# Patient Record
Sex: FEMALE | Race: WHITE | NOT HISPANIC OR LATINO | Employment: UNEMPLOYED | URBAN - METROPOLITAN AREA
[De-identification: names, ages, dates, MRNs, and addresses within clinical notes are randomized per-mention and may not be internally consistent; named-entity substitution may affect disease eponyms.]

---

## 2018-09-04 ENCOUNTER — HOSPITAL ENCOUNTER (OUTPATIENT)
Dept: RADIOLOGY | Facility: HOSPITAL | Age: 28
Discharge: HOME | End: 2018-09-04
Attending: OBSTETRICS & GYNECOLOGY | Admitting: RADIOLOGY
Payer: COMMERCIAL

## 2018-09-04 DIAGNOSIS — Z31.41 FERTILITY TESTING: ICD-10-CM

## 2018-09-04 PROCEDURE — 74740 X-RAY FEMALE GENITAL TRACT: CPT

## 2018-09-04 PROCEDURE — 58340 CATHETER FOR HYSTEROGRAPHY: CPT

## 2018-09-04 RX ADMIN — IOHEXOL 8 ML: 240 INJECTION, SOLUTION INTRATHECAL; INTRAVASCULAR; INTRAVENOUS; ORAL at 17:40

## 2018-10-15 RX ORDER — ESCITALOPRAM OXALATE 20 MG/1
20 TABLET ORAL
COMMUNITY
End: 2022-08-01

## 2018-10-15 NOTE — PRE-PROCEDURE INSTRUCTIONS
Pre-Surgery Instructions:   Medication Instructions    escitalopram (LEXAPRO) 20 mg tablet Instructed patient per Anesthesia Guidelines   Prenatal MV-Min-Fe Fum-FA-DHA (PRENATAL 1 PO) Instructed patient per Anesthesia Guidelines  Spoke to pt  Medication list reviewed & instructed  As of 10 15 18 instructed on tylenol only  Pt takes lexapro at hs  Am DOS no meds  Showering instructions given at time of call  All instructions verbally understood by patient  No further questions  Antidepressant Med Class     Continue to take this medication on your normal schedule  If this is an oral medication and you take it in the morning, then you may take this medicine with a sip of water

## 2018-10-18 ENCOUNTER — ANESTHESIA EVENT (OUTPATIENT)
Dept: PERIOP | Facility: HOSPITAL | Age: 28
End: 2018-10-18
Payer: COMMERCIAL

## 2018-10-19 ENCOUNTER — HOSPITAL ENCOUNTER (OUTPATIENT)
Facility: HOSPITAL | Age: 28
Setting detail: OUTPATIENT SURGERY
Discharge: HOME | End: 2018-10-19
Attending: OBSTETRICS & GYNECOLOGY | Admitting: OBSTETRICS & GYNECOLOGY
Payer: COMMERCIAL

## 2018-10-19 ENCOUNTER — ANESTHESIA (OUTPATIENT)
Dept: PERIOP | Facility: HOSPITAL | Age: 28
End: 2018-10-19
Payer: COMMERCIAL

## 2018-10-19 VITALS
TEMPERATURE: 98.7 F | RESPIRATION RATE: 21 BRPM | BODY MASS INDEX: 20.49 KG/M2 | HEART RATE: 72 BPM | OXYGEN SATURATION: 99 % | HEIGHT: 64 IN | SYSTOLIC BLOOD PRESSURE: 101 MMHG | WEIGHT: 120 LBS | DIASTOLIC BLOOD PRESSURE: 63 MMHG

## 2018-10-19 DIAGNOSIS — N70.11 HYDROSALPINX: ICD-10-CM

## 2018-10-19 DIAGNOSIS — Z90.79 STATUS POST BILATERAL SALPINGECTOMY: Primary | ICD-10-CM

## 2018-10-19 LAB — EXT PREGNANCY TEST URINE: NEGATIVE

## 2018-10-19 PROCEDURE — 88302 TISSUE EXAM BY PATHOLOGIST: CPT | Performed by: PATHOLOGY

## 2018-10-19 RX ORDER — IBUPROFEN 600 MG/1
600 TABLET ORAL EVERY 6 HOURS PRN
Qty: 30 TABLET | Refills: 0
Start: 2018-10-19 | End: 2019-02-15

## 2018-10-19 RX ORDER — ROCURONIUM BROMIDE 10 MG/ML
INJECTION, SOLUTION INTRAVENOUS AS NEEDED
Status: DISCONTINUED | OUTPATIENT
Start: 2018-10-19 | End: 2018-10-19 | Stop reason: SURG

## 2018-10-19 RX ORDER — CEFAZOLIN SODIUM 1 G/3ML
INJECTION, POWDER, FOR SOLUTION INTRAMUSCULAR; INTRAVENOUS AS NEEDED
Status: DISCONTINUED | OUTPATIENT
Start: 2018-10-19 | End: 2018-10-19 | Stop reason: SURG

## 2018-10-19 RX ORDER — BUPIVACAINE HYDROCHLORIDE 2.5 MG/ML
INJECTION, SOLUTION INFILTRATION; PERINEURAL AS NEEDED
Status: DISCONTINUED | OUTPATIENT
Start: 2018-10-19 | End: 2018-10-19 | Stop reason: HOSPADM

## 2018-10-19 RX ORDER — ONDANSETRON 2 MG/ML
4 INJECTION INTRAMUSCULAR; INTRAVENOUS ONCE AS NEEDED
Status: DISCONTINUED | OUTPATIENT
Start: 2018-10-19 | End: 2018-10-19 | Stop reason: HOSPADM

## 2018-10-19 RX ORDER — ONDANSETRON 2 MG/ML
INJECTION INTRAMUSCULAR; INTRAVENOUS AS NEEDED
Status: DISCONTINUED | OUTPATIENT
Start: 2018-10-19 | End: 2018-10-19 | Stop reason: SURG

## 2018-10-19 RX ORDER — LIDOCAINE HYDROCHLORIDE 10 MG/ML
INJECTION, SOLUTION INFILTRATION; PERINEURAL AS NEEDED
Status: DISCONTINUED | OUTPATIENT
Start: 2018-10-19 | End: 2018-10-19 | Stop reason: SURG

## 2018-10-19 RX ORDER — GLYCOPYRROLATE 0.2 MG/ML
INJECTION INTRAMUSCULAR; INTRAVENOUS AS NEEDED
Status: DISCONTINUED | OUTPATIENT
Start: 2018-10-19 | End: 2018-10-19 | Stop reason: SURG

## 2018-10-19 RX ORDER — IBUPROFEN 600 MG/1
600 TABLET ORAL EVERY 6 HOURS PRN
Status: DISCONTINUED | OUTPATIENT
Start: 2018-10-19 | End: 2018-10-19 | Stop reason: HOSPADM

## 2018-10-19 RX ORDER — MIDAZOLAM HYDROCHLORIDE 1 MG/ML
INJECTION INTRAMUSCULAR; INTRAVENOUS AS NEEDED
Status: DISCONTINUED | OUTPATIENT
Start: 2018-10-19 | End: 2018-10-19 | Stop reason: SURG

## 2018-10-19 RX ORDER — ACETAMINOPHEN 325 MG/1
975 TABLET ORAL EVERY 6 HOURS PRN
Status: DISCONTINUED | OUTPATIENT
Start: 2018-10-19 | End: 2018-10-19 | Stop reason: HOSPADM

## 2018-10-19 RX ORDER — KETAMINE HYDROCHLORIDE 50 MG/ML
INJECTION, SOLUTION, CONCENTRATE INTRAMUSCULAR; INTRAVENOUS AS NEEDED
Status: DISCONTINUED | OUTPATIENT
Start: 2018-10-19 | End: 2018-10-19 | Stop reason: SURG

## 2018-10-19 RX ORDER — SODIUM CHLORIDE, SODIUM LACTATE, POTASSIUM CHLORIDE, CALCIUM CHLORIDE 600; 310; 30; 20 MG/100ML; MG/100ML; MG/100ML; MG/100ML
125 INJECTION, SOLUTION INTRAVENOUS CONTINUOUS
Status: DISCONTINUED | OUTPATIENT
Start: 2018-10-19 | End: 2018-10-19 | Stop reason: HOSPADM

## 2018-10-19 RX ORDER — KETOROLAC TROMETHAMINE 30 MG/ML
30 INJECTION, SOLUTION INTRAMUSCULAR; INTRAVENOUS ONCE
Status: COMPLETED | OUTPATIENT
Start: 2018-10-19 | End: 2018-10-19

## 2018-10-19 RX ORDER — MEPERIDINE HYDROCHLORIDE 25 MG/ML
12.5 INJECTION INTRAMUSCULAR; INTRAVENOUS; SUBCUTANEOUS
Status: DISCONTINUED | OUTPATIENT
Start: 2018-10-19 | End: 2018-10-19 | Stop reason: HOSPADM

## 2018-10-19 RX ORDER — MAGNESIUM HYDROXIDE 1200 MG/15ML
LIQUID ORAL AS NEEDED
Status: DISCONTINUED | OUTPATIENT
Start: 2018-10-19 | End: 2018-10-19 | Stop reason: HOSPADM

## 2018-10-19 RX ORDER — PROPOFOL 10 MG/ML
INJECTION, EMULSION INTRAVENOUS AS NEEDED
Status: DISCONTINUED | OUTPATIENT
Start: 2018-10-19 | End: 2018-10-19 | Stop reason: SURG

## 2018-10-19 RX ADMIN — LIDOCAINE HYDROCHLORIDE 40 MG: 10 INJECTION, SOLUTION INFILTRATION; PERINEURAL at 07:44

## 2018-10-19 RX ADMIN — PROPOFOL 150 MG: 10 INJECTION, EMULSION INTRAVENOUS at 07:44

## 2018-10-19 RX ADMIN — CEFAZOLIN 2000 MG: 1 INJECTION, POWDER, FOR SOLUTION INTRAVENOUS at 07:38

## 2018-10-19 RX ADMIN — KETAMINE HYDROCHLORIDE 10 MG: 50 INJECTION, SOLUTION INTRAMUSCULAR; INTRAVENOUS at 08:27

## 2018-10-19 RX ADMIN — ONDANSETRON 4 MG: 2 INJECTION INTRAMUSCULAR; INTRAVENOUS at 08:09

## 2018-10-19 RX ADMIN — DEXAMETHASONE SODIUM PHOSPHATE 10 MG: 10 INJECTION INTRAMUSCULAR; INTRAVENOUS at 08:14

## 2018-10-19 RX ADMIN — GLYCOPYRROLATE 0.2 MG: 0.2 INJECTION, SOLUTION INTRAMUSCULAR; INTRAVENOUS at 08:45

## 2018-10-19 RX ADMIN — NEOSTIGMINE METHYLSULFATE 3 MG: 1 INJECTION, SOLUTION INTRAMUSCULAR; INTRAVENOUS; SUBCUTANEOUS at 08:45

## 2018-10-19 RX ADMIN — SODIUM CHLORIDE, SODIUM LACTATE, POTASSIUM CHLORIDE, AND CALCIUM CHLORIDE: .6; .31; .03; .02 INJECTION, SOLUTION INTRAVENOUS at 07:30

## 2018-10-19 RX ADMIN — ROCURONIUM BROMIDE 30 MG: 10 INJECTION INTRAVENOUS at 07:44

## 2018-10-19 RX ADMIN — ACETAMINOPHEN 975 MG: 325 TABLET, FILM COATED ORAL at 09:16

## 2018-10-19 RX ADMIN — KETOROLAC TROMETHAMINE 30 MG: 30 INJECTION, SOLUTION INTRAMUSCULAR at 09:03

## 2018-10-19 RX ADMIN — MIDAZOLAM 2 MG: 1 INJECTION INTRAMUSCULAR; INTRAVENOUS at 07:34

## 2018-10-19 RX ADMIN — KETAMINE HYDROCHLORIDE 10 MG: 50 INJECTION, SOLUTION INTRAMUSCULAR; INTRAVENOUS at 08:17

## 2018-10-19 NOTE — PROGRESS NOTES
Dr Mari Gonzalez aware of pain, may administer tylenol in PACU, Dr Mari Gonzalez at bedside administering ketamine

## 2018-10-19 NOTE — OP NOTE
OPERATIVE REPORT  PATIENT NAME: Lenny Alcaraz    :  1990  MRN: [id number]  Pt Location: [facility] OR ROOM 03    SURGERY DATE: 10/19/2018    Surgeon(s) and Role:     * Delores Davey MD - Primary     * Charlette Campbell MD - Assisting    Preop Diagnosis:  Hydrosalpinx [N70 11]    Post-Op Diagnosis Codes:  Bilateral Hydrosalpinx [N70 11] and pelvic adhesion    Procedures:  Pelvic adhesiolysis and bilateral salpingectomy  Specimen(s):  ID Type Source Tests Collected by Time Destination   1 :  Tissue Fallopian Tube, Left TISSUE EXAM Kiera Plata MD 10/19/2018 1192    2 :  Tissue Fallopian Tube, Right TISSUE EXAM Kiera Plata MD 10/19/2018 5328        Estimated Blood Loss:   Minimal, urine output 35 ml    Drains:  [REMOVED] Urethral Catheter Non-latex 16 Fr  (Removed)   Number of days: 0       Anesthesia Type:   General    Operative Indications:  Bilateral hydrosalpinx [N70 11]      Operative Findings:  Uterus anteflexed and retroverted, normal in size and shape, and sounded to 7cm  Both ovaries were normal in size and shape  Both tubes were adhesed to its ipislateral ovary or large bowel with sheets of thin adhesion  These adhesion sheets were cut with sharp dissection  Both tubes were occluded distally with fluid in the distal ampullary segment, consistent with bilateral hydrosalpinx  Right gutter was inspected and noted to be normal  Liver and gall bladder were removed and noted to be normal as well  Complications:   None    Procedure and Technique:  Under general anesthesia, abdomen, perineum, and vagina were prepped and draped with sterile technique  Time out was taken  2 gm Ancef was given IV  Roman catheter was inserted into bladder and drained by continuous gravity  A single valve Graves speculum was placed vaginally  Cervix was normal and had a nulliparous os  Uterus was sounded to 7cm  Cervix was dilated to allow insertion of a Yang uterine manipulator        Procedure then moved to the abdomen  A 2cm transverse incision along the lower margin of the umbilical recess  A 5mm bladeless trocar was inserted into the abdomen with no difficulty  CO2 was insufflated into the abdomen  Patient was placed in a deep Tredelenberg position  Two additional 5mm ports were placed in the left and right lower ports at 2 finger breaths superior and medial to the anterior superior iliac spine  Inspection of the pelvic structures was carried out and the above operative findings were noted  Having confirmed bilateral adnexal adhesion and hydrosalpinx, pelvic adhesiolysis and bilateral salpingectomy was performed  Procedure started on the right adnexa  The adhesion bands were dissected so the right tube was freed from the ovary  The right tube was placed on traction with a pair of atraumatic grasping instruments  An Enseal was placed in the opposite lower abdominal port  The right hydrosalpinx was detached at its distal end from the mesosalpinx with the Enseal  Dissection along the mesenteric margin of the right tube was done in a retrograde fashion  Then the right tube was transected from the cornual area of the uterus with double coagulation  The entire right hydrosalpinx was completely detached  Specimen was placed in the culdesac for removal at the end of the procedure  A similar procedure was performed on the opposite side  Again before the left tube was detached, extensive adhesion bands were removed with sharp dissection  Having completely detached both hydrosalpinx, they were removed in entirety through the lower abdominal port without a need for a specimen pouch  Specimens were handed off to circulating nurse  They were immersed in formalin in separate containers and would be sent to pathology for histologic examination  Pelvis was irrigated with 1 liter of heparinized lactated ringer solution  All the fluid in the culdesac was removed   Hemostasis of both adnexal area was observed  All instruments were removed from the abdomen  All incisions were infiltrated with 0 25% Marcaine  A total of 10ml was used  Roman catheter was removed  Single toothed tenaculum and uterine manipulator were removed  Hemostasis on the cervix was observed  Patient was awakened from general anesthesia  She was extubated in the operating room and taken to recovery room in stable condition       I was present for the entire procedure    Patient Disposition:  PACU     SIGNATURE: Esha Villarreal MD  DATE: October 19, 2018  TIME: 9:04 AM

## 2018-10-19 NOTE — ANESTHESIA POSTPROCEDURE EVALUATION
Post-Op Assessment Note      CV Status:  Stable    Mental Status:  Awake    Hydration Status:  Stable    PONV Controlled:  None    Airway Patency:  Patent    Post Op Vitals Reviewed: Yes          Staff: Anesthesiologist           /65 (10/19/18 0900)    Temp 99 2 °F (37 3 °C) (10/19/18 0900)    Pulse 73 (10/19/18 0900)   Resp 12 (10/19/18 0900)    SpO2 100 % (10/19/18 0900)

## 2018-10-19 NOTE — DISCHARGE INSTRUCTIONS
You may alternate between Motrin and Tylenol as needed for pain  You may take up to 600mg of Motrin/ Ibuprofen every 6 hours  Salpingectomy   WHAT YOU NEED TO KNOW:   A salpingectomy is surgery to remove one or both of your fallopian tubes  The fallopian tubes carry eggs from the ovaries to the uterus  They are part of a woman's reproductive system  A salpingectomy may be done to treat an ectopic pregnancy, cancer, endometriosis, or an infection  It may also be done to prevent pregnancy or some types of cancer  DISCHARGE INSTRUCTIONS:   Call 911 for any of the following:   · You feel lightheaded, short of breath, and have chest pain  · You cough up blood  · You have trouble breathing  Seek care immediately if:   · Your arm or leg feels warm, tender, and painful  It may look swollen and red  · Blood soaks through your bandage  · Your stitches come apart  · You soak through 1 sanitary pad in 1 hour  · You have trouble urinating or cannot urinate at all  Contact your healthcare provider if:   · You have a fever or chills  · Your wound is red, swollen, or draining pus  · You have pus or a foul-smelling odor coming from your vagina  · Your pain does not get better after you take your medicine  · You have nausea or are vomiting  · Your skin is itchy, swollen, or you have a rash  · You have questions or concerns about your condition or care  Medicines: You may need any of the following:  · Prescription pain medicine  may be given  Ask your healthcare provider how to take this medicine safely  · NSAIDs , such as ibuprofen, help decrease swelling, pain, and fever  NSAIDs can cause stomach bleeding or kidney problems in certain people  If you take blood thinner medicine, always ask your healthcare provider if NSAIDs are safe for you  Always read the medicine label and follow directions  · Take your medicine as directed    Contact your healthcare provider if you think your medicine is not helping or if you have side effects  Tell him or her if you are allergic to any medicine  Keep a list of the medicines, vitamins, and herbs you take  Include the amounts, and when and why you take them  Bring the list or the pill bottles to follow-up visits  Carry your medicine list with you in case of an emergency  Care for your wound as directed:  Ask your healthcare provider when your wound can get wet  Do not take a bath until your healthcare provider says it is okay  Take a shower only  Carefully wash around the wound with soap and water  Let the soap and water gently run over your incision  Do not  scrub your incision  Dry the area and put on new, clean bandages as directed  Change your bandages when they get wet or dirty  If you have strips of medical tape, let them fall off on their own  Activity:  Ask your healthcare provider when you can return to your normal activities  Do not douche, use tampons, or have sex until your healthcare provider says it is okay  These activities may cause infection  Do not exercise or lift anything heavy until your healthcare provider says it is okay  This may put too much stress on your incision  Follow up with your healthcare provider as directed:  Write down your questions so you remember to ask them during your visits  © 2017 [address] Information is for End User's use only and may not be sold, redistributed or otherwise used for commercial purposes  All illustrations and images included in CareNotes® are the copyrighted property of A D A M , Inc  or Mahad Valdez  The above information is an  only  It is not intended as medical advice for individual conditions or treatments  Talk to your doctor, nurse or pharmacist before following any medical regimen to see if it is safe and effective for you

## 2018-10-19 NOTE — PERIOPERATIVE NURSING NOTE
[id number], pt denies nausea, reports improvement in pain, assessment unchanged, report called, no questions, pt transferred to [facility]

## 2018-10-19 NOTE — ANESTHESIA PREPROCEDURE EVALUATION
Review of Systems/Medical History  Patient summary reviewed  Chart reviewed      Cardiovascular   Pulmonary       GI/Hepatic            Endo/Other     GYN       Hematology   Musculoskeletal       Neurology   Psychology       Comment: Recovering opiod addict         Physical Exam    Airway    Mallampati score: II  TM Distance: >3 FB  Neck ROM: full     Dental   No notable dental hx     Cardiovascular  Rhythm: regular, Rate: normal, Cardiovascular exam normal    Pulmonary  Pulmonary exam normal Breath sounds clear to auscultation,     Other Findings        Anesthesia Plan  ASA Score- 2     Anesthesia Type- general with ASA Monitors  Additional Monitors:   Airway Plan: ETT  Plan Factors-    Induction- intravenous  Postoperative Plan- Plan for postoperative opioid use  Informed Consent- Anesthetic plan and risks discussed with patient and spouse  I personally reviewed this patient with the CRNA  Discussed and agreed on the Anesthesia Plan with the CRNA  Robin Harry

## 2019-02-14 ENCOUNTER — HOSPITAL ENCOUNTER (EMERGENCY)
Facility: HOSPITAL | Age: 29
Discharge: HOME | End: 2019-02-15
Attending: EMERGENCY MEDICINE | Admitting: EMERGENCY MEDICINE
Payer: COMMERCIAL

## 2019-02-14 ENCOUNTER — APPOINTMENT (EMERGENCY)
Dept: ULTRASOUND IMAGING | Facility: HOSPITAL | Age: 29
End: 2019-02-14
Payer: COMMERCIAL

## 2019-02-14 DIAGNOSIS — O46.8X9 SUBCHORIONIC HEMORRHAGE: ICD-10-CM

## 2019-02-14 DIAGNOSIS — O41.8X90 SUBCHORIONIC HEMORRHAGE: ICD-10-CM

## 2019-02-14 DIAGNOSIS — O20.9 VAGINAL BLEEDING AFFECTING EARLY PREGNANCY: Primary | ICD-10-CM

## 2019-02-14 DIAGNOSIS — O20.0 THREATENED MISCARRIAGE IN EARLY PREGNANCY: ICD-10-CM

## 2019-02-14 LAB
ABO GROUP BLD: NORMAL
ALBUMIN SERPL BCP-MCNC: 3.3 G/DL (ref 3.5–5)
ALP SERPL-CCNC: 97 U/L (ref 46–116)
ALT SERPL W P-5'-P-CCNC: 25 U/L (ref 12–78)
ANION GAP SERPL CALCULATED.3IONS-SCNC: 7 MMOL/L (ref 4–13)
AST SERPL W P-5'-P-CCNC: 16 U/L (ref 5–45)
B-HCG SERPL-ACNC: ABNORMAL MIU/ML
BASOPHILS # BLD AUTO: 0.1 THOUSANDS/ΜL (ref 0–0.1)
BASOPHILS NFR BLD AUTO: 1 % (ref 0–1)
BILIRUB SERPL-MCNC: 0.2 MG/DL (ref 0.2–1)
BLD GP AB SCN SERPL QL: NEGATIVE
BUN SERPL-MCNC: 8 MG/DL (ref 5–25)
CALCIUM SERPL-MCNC: 8.8 MG/DL (ref 8.3–10.1)
CHLORIDE SERPL-SCNC: 100 MMOL/L (ref 100–108)
CLARITY, POC: CLEAR
CO2 SERPL-SCNC: 30 MMOL/L (ref 21–32)
COLOR, POC: YELLOW
CREAT SERPL-MCNC: 0.59 MG/DL (ref 0.6–1.3)
EOSINOPHIL # BLD AUTO: 0.33 THOUSAND/ΜL (ref 0–0.61)
EOSINOPHIL NFR BLD AUTO: 3 % (ref 0–6)
ERYTHROCYTE [DISTWIDTH] IN BLOOD BY AUTOMATED COUNT: 11.1 % (ref 11.6–15.1)
EXT BILIRUBIN, UA: NORMAL
EXT BLOOD URINE: NORMAL
EXT GLUCOSE, UA: NORMAL
EXT KETONES: NORMAL
EXT NITRITE, UA: NORMAL
EXT PH, UA: 7
EXT PREG TEST URINE: POSITIVE
EXT PROTEIN, UA: NORMAL
EXT SPECIFIC GRAVITY, UA: 1.01
EXT UROBILINOGEN: NORMAL
GFR SERPL CREATININE-BSD FRML MDRD: 126 ML/MIN/1.73SQ M
GLUCOSE SERPL-MCNC: 87 MG/DL (ref 65–140)
HCT VFR BLD AUTO: 39.1 % (ref 34.8–46.1)
HGB BLD-MCNC: 13.2 G/DL (ref 11.5–15.4)
IMM GRANULOCYTES # BLD AUTO: 0.05 THOUSAND/UL (ref 0–0.2)
IMM GRANULOCYTES NFR BLD AUTO: 1 % (ref 0–2)
LYMPHOCYTES # BLD AUTO: 2.15 THOUSANDS/ΜL (ref 0.6–4.47)
LYMPHOCYTES NFR BLD AUTO: 20 % (ref 14–44)
MCH RBC QN AUTO: 31.3 PG (ref 26.8–34.3)
MCHC RBC AUTO-ENTMCNC: 33.8 G/DL (ref 31.4–37.4)
MCV RBC AUTO: 93 FL (ref 82–98)
MONOCYTES # BLD AUTO: 0.69 THOUSAND/ΜL (ref 0.17–1.22)
MONOCYTES NFR BLD AUTO: 6 % (ref 4–12)
NEUTROPHILS # BLD AUTO: 7.63 THOUSANDS/ΜL (ref 1.85–7.62)
NEUTS SEG NFR BLD AUTO: 69 % (ref 43–75)
NRBC BLD AUTO-RTO: 0 /100 WBCS
PLATELET # BLD AUTO: 422 THOUSANDS/UL (ref 149–390)
PMV BLD AUTO: 9.2 FL (ref 8.9–12.7)
POTASSIUM SERPL-SCNC: 4.2 MMOL/L (ref 3.5–5.3)
PROT SERPL-MCNC: 8 G/DL (ref 6.4–8.2)
RBC # BLD AUTO: 4.22 MILLION/UL (ref 3.81–5.12)
RH BLD: POSITIVE
SODIUM SERPL-SCNC: 137 MMOL/L (ref 136–145)
SPECIMEN EXPIRATION DATE: NORMAL
WBC # BLD AUTO: 10.95 THOUSAND/UL (ref 4.31–10.16)
WBC # BLD EST: NORMAL 10*3/UL

## 2019-02-14 PROCEDURE — 86850 RBC ANTIBODY SCREEN: CPT | Performed by: PHYSICIAN ASSISTANT

## 2019-02-14 PROCEDURE — 86901 BLOOD TYPING SEROLOGIC RH(D): CPT | Performed by: PHYSICIAN ASSISTANT

## 2019-02-14 PROCEDURE — 84702 CHORIONIC GONADOTROPIN TEST: CPT | Performed by: PHYSICIAN ASSISTANT

## 2019-02-14 PROCEDURE — 99284 EMERGENCY DEPT VISIT MOD MDM: CPT

## 2019-02-14 PROCEDURE — 80053 COMPREHEN METABOLIC PANEL: CPT | Performed by: PHYSICIAN ASSISTANT

## 2019-02-14 PROCEDURE — 36415 COLL VENOUS BLD VENIPUNCTURE: CPT | Performed by: PHYSICIAN ASSISTANT

## 2019-02-14 PROCEDURE — 81025 URINE PREGNANCY TEST: CPT | Performed by: PHYSICIAN ASSISTANT

## 2019-02-14 PROCEDURE — 86900 BLOOD TYPING SEROLOGIC ABO: CPT | Performed by: PHYSICIAN ASSISTANT

## 2019-02-14 PROCEDURE — 96361 HYDRATE IV INFUSION ADD-ON: CPT

## 2019-02-14 PROCEDURE — 81002 URINALYSIS NONAUTO W/O SCOPE: CPT | Performed by: PHYSICIAN ASSISTANT

## 2019-02-14 PROCEDURE — 85025 COMPLETE CBC W/AUTO DIFF WBC: CPT | Performed by: PHYSICIAN ASSISTANT

## 2019-02-14 PROCEDURE — 76801 OB US < 14 WKS SINGLE FETUS: CPT

## 2019-02-14 PROCEDURE — 87070 CULTURE OTHR SPECIMN AEROBIC: CPT | Performed by: PHYSICIAN ASSISTANT

## 2019-02-14 PROCEDURE — 96360 HYDRATION IV INFUSION INIT: CPT

## 2019-02-14 RX ORDER — ACETAMINOPHEN 325 MG/1
650 TABLET ORAL ONCE
Status: COMPLETED | OUTPATIENT
Start: 2019-02-14 | End: 2019-02-14

## 2019-02-14 RX ADMIN — SODIUM CHLORIDE 1000 ML: 0.9 INJECTION, SOLUTION INTRAVENOUS at 21:54

## 2019-02-14 RX ADMIN — ACETAMINOPHEN 650 MG: 325 TABLET, FILM COATED ORAL at 23:30

## 2019-02-15 VITALS
HEIGHT: 63 IN | TEMPERATURE: 98 F | OXYGEN SATURATION: 98 % | SYSTOLIC BLOOD PRESSURE: 104 MMHG | HEART RATE: 71 BPM | BODY MASS INDEX: 22.15 KG/M2 | WEIGHT: 125 LBS | RESPIRATION RATE: 18 BRPM | DIASTOLIC BLOOD PRESSURE: 65 MMHG

## 2019-02-15 NOTE — ED NOTES
PRAKASH Cruz and [address] [place] [address] in room for pelvic exam       Tiffanie German RN  02/14/19 6333
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
## 2019-02-15 NOTE — ED NOTES
Patient transported to [facility] [address] JEFRY Barkley  02/14/19 9514

## 2019-02-15 NOTE — ED CARE HANDOFF
Emergency Department Sign Out Note        Sign out and transfer of care from **[facility]*  See Separate Emergency Department note  The patient, Becky Watson, was evaluated by the previous provider for vaginal bleeding in early pregnancy with associated crampy pelvic pain  Workup Completed:  RH positive, hcg 75,000, hb normal    ED Course / Workup Pending (followup): Patient received tylenol and fluids, pain and bleeding decreased    Reviewed ultrasound report with Dr René Pacheco who agrees with plan in department  ED Course as of Feb 15 0654   Fri Feb 15, 2019   0133 Paged obstetrics        Procedures  MDM  Number of Diagnoses or Management Options  Subchorionic hemorrhage: new and requires workup  Threatened miscarriage in early pregnancy: new and requires workup  Vaginal bleeding affecting early pregnancy: new and requires workup     Amount and/or Complexity of Data Reviewed  Clinical lab tests: reviewed  Tests in the radiology section of CPT®: reviewed  Obtain history from someone other than the patient: yes  Discuss the patient with other providers: yes    Patient Progress  Patient progress: improved      Disposition  Final diagnoses:   Vaginal bleeding affecting early pregnancy   Threatened miscarriage in early pregnancy   Subchorionic hemorrhage     Time reflects when diagnosis was documented in both MDM as applicable and the Disposition within this note     Time User Action Codes Description Comment    2/14/2019 11:04 PM Dominguez Resendez Add [O20 8] Vaginal bleeding affecting early pregnancy     2/15/2019  2:16 AM Charles Casillas Add [O20 0] Threatened miscarriage in early pregnancy     2/15/2019  2:16 AM Charles Casillas Add [T16 3V21,  [address] hemorrhage       ED Disposition     ED Disposition Condition Date/Time Comment    Discharge Stable Fri Feb 15, 2019  2:16 AM Becky Watson discharge to home/self care              Follow-up Information     Follow up With Specialties Details Why Huey Torres  Call today          Discharge Medication List as of 2/15/2019  2:18 AM      CONTINUE these medications which have NOT CHANGED    Details   escitalopram (LEXAPRO) 20 mg tablet Take 20 mg by mouth daily at bedtime  , Historical Med      Prenatal MV-Min-Fe Fum-FA-DHA (PRENATAL 1 PO) Take by mouth, Historical Med           Outpatient Discharge Orders   US pelvis transabdominal only   Standing Status: Future Standing Exp   Date: 02/15/23          ED Provider  Electronically Signed by     Harry Murphy DO  02/15/19 4143

## 2019-02-15 NOTE — ED NOTES
Patient c/o of pelvic cramping that began around 2030 when she arrived in ED, along with light to moderate vaginal bleeding  Patient has been in contact with her OB who advised patient to come to ED for further evaluation and ultrasound        Micha Love RN  02/14/19 2519

## 2019-02-17 LAB — BACTERIA GENITAL AEROBE CULT: NORMAL

## 2020-07-27 ENCOUNTER — ANNUAL EXAM (OUTPATIENT)
Dept: OBGYN CLINIC | Facility: CLINIC | Age: 30
End: 2020-07-27
Payer: COMMERCIAL

## 2020-07-27 VITALS
DIASTOLIC BLOOD PRESSURE: 58 MMHG | TEMPERATURE: 98.3 F | HEIGHT: 64 IN | WEIGHT: 125 LBS | BODY MASS INDEX: 21.34 KG/M2 | SYSTOLIC BLOOD PRESSURE: 110 MMHG

## 2020-07-27 DIAGNOSIS — Z01.419 WOMEN'S ANNUAL ROUTINE GYNECOLOGICAL EXAMINATION: Primary | ICD-10-CM

## 2020-07-27 PROCEDURE — S0612 ANNUAL GYNECOLOGICAL EXAMINA: HCPCS | Performed by: NURSE PRACTITIONER

## 2020-07-27 NOTE — PROGRESS NOTES
Subjective    HPI:     Brian Lou is a 35 y o  female  She is a  1 Para 1, with  x 1 - 10 months ago  Conceived via IVF  Her menstrual cycles are irregular due lactating  Her current method of contraception includes tubal ligation prior to IVF due to blockage and fluid in tubes  She complains of decrease libido  She denies /GI and Gyn complaints  She feels safe at home  She denies depression/anxiety, stable on Lexapro  Medical, surgical and family history reviewed  Her dental care is up-to-date  She eats a healthy diet and exercises regularly  She is happy with her weight  Gynecologic History    Patient's last menstrual period was 2020 (approximate)  Gardasil Vaccine Series: completed per patient  Last Pap: 2018  Results were: normal per patient    Obstetric History    OB History    Para Term  AB Living   1             SAB TAB Ectopic Multiple Live Births                  # Outcome Date GA Lbr Kirit/2nd Weight Sex Delivery Anes PTL Lv   1 Current                The following portions of the patient's history were reviewed and updated as appropriate: allergies, current medications, past family history, past medical history, past social history, past surgical history and problem list     Review of Systems    Pertinent items are noted in HPI  Objective    Physical Exam   Constitutional: Vital signs are normal  She appears well-developed and well-nourished  Genitourinary: Vagina normal and uterus normal  Pelvic exam was performed with patient supine  There is no rash, tenderness, lesion or Bartholin's cyst on the right labia  There is no rash, tenderness, lesion or Bartholin's cyst on the left labia  Right adnexum does not display mass, does not display tenderness and does not display fullness  Left adnexum does not display mass, does not display tenderness and does not display fullness  Cervix is parous   Cervix does not exhibit motion tenderness, lesion, discharge, friability, polyp or nabothian cyst      Uterus is anteverted  HENT:   Head: Normocephalic and atraumatic  Neck: Neck supple  No thyromegaly present  Cardiovascular: Normal rate, regular rhythm, S1 normal, S2 normal and normal heart sounds  Pulmonary/Chest: Effort normal and breath sounds normal  Right breast exhibits no inverted nipple, no mass, no nipple discharge, no skin change and no tenderness  Left breast exhibits no inverted nipple, no mass, no nipple discharge, no skin change and no tenderness  Abdominal: Soft  Normal appearance and bowel sounds are normal  She exhibits no distension and no mass  There is no tenderness  There is no guarding  Lymphadenopathy:     She has no cervical adenopathy  She has no axillary adenopathy  Neurological: She is alert  Skin: Skin is warm, dry and intact  No rash noted  Psychiatric: She has a normal mood and affect  Nursing note and vitals reviewed  Assessment and Plan    Cheikh Gama was seen today for gynecologic exam     Diagnoses and all orders for this visit:    Women's annual routine gynecological examination  -     Pap Lb (Liquid-based)      Patient informed of a Stable GYN exam  A pap smear was performed  I have discussed the importance of exercise and healthy diet as well as adequate intake of calcium and vitamin D  The current ASCCP guidelines were reviewed  The low risk patient will receive pap smear screening every 3 years until the age of 34 and then every 3 to 5 years with HPV co-testing from the ages of 33-67  I emphasized the importance of an annual pelvic and breast exam  A yearly mammogram is recommended for breast cancer screening starting at age 36  All questions have been answered to her satisfaction  Follow up in: 1 year

## 2020-07-29 LAB
CYTOLOGIST CVX/VAG CYTO: NORMAL
DX ICD CODE: NORMAL
OTHER STN SPEC: NORMAL
PATH REPORT.FINAL DX SPEC: NORMAL
SL AMB NOTE:: NORMAL
SL AMB SPECIMEN ADEQUACY: NORMAL

## 2021-04-03 ENCOUNTER — IMMUNIZATIONS (OUTPATIENT)
Dept: FAMILY MEDICINE CLINIC | Facility: HOSPITAL | Age: 31
End: 2021-04-03

## 2021-04-03 DIAGNOSIS — Z23 ENCOUNTER FOR IMMUNIZATION: Primary | ICD-10-CM

## 2021-04-03 PROCEDURE — 0001A SARS-COV-2 / COVID-19 MRNA VACCINE (PFIZER-BIONTECH) 30 MCG: CPT

## 2021-04-03 PROCEDURE — 91300 SARS-COV-2 / COVID-19 MRNA VACCINE (PFIZER-BIONTECH) 30 MCG: CPT

## 2021-04-25 ENCOUNTER — IMMUNIZATIONS (OUTPATIENT)
Dept: FAMILY MEDICINE CLINIC | Facility: HOSPITAL | Age: 31
End: 2021-04-25

## 2021-04-25 DIAGNOSIS — Z23 ENCOUNTER FOR IMMUNIZATION: Primary | ICD-10-CM

## 2021-04-25 PROCEDURE — 91300 SARS-COV-2 / COVID-19 MRNA VACCINE (PFIZER-BIONTECH) 30 MCG: CPT

## 2021-04-25 PROCEDURE — 0002A SARS-COV-2 / COVID-19 MRNA VACCINE (PFIZER-BIONTECH) 30 MCG: CPT

## 2021-09-13 NOTE — PRE-PROCEDURE INSTRUCTIONS
Pre-Surgery Instructions:   Medication Instructions    escitalopram (LEXAPRO) 20 mg tablet Instructed patient per Anesthesia Guidelines  pm usage only    NON FORMULARY Instructed patient per Anesthesia Guidelines  stop 9/13    Prenatal Vit-Fe Fumarate-FA (PRENATAL VITAMIN PO) Instructed patient per Anesthesia Guidelines  stop 9/13   Pre Procedure instructions given and verbalized understanding  NPO after MN  Bathing reviewed  Stop supplements/vitamins

## 2021-09-16 ENCOUNTER — APPOINTMENT (OUTPATIENT)
Dept: LAB | Facility: HOSPITAL | Age: 31
End: 2021-09-16
Attending: OBSTETRICS & GYNECOLOGY
Payer: COMMERCIAL

## 2021-09-16 DIAGNOSIS — Z01.812 PRE-OPERATIVE LABORATORY EXAMINATION: ICD-10-CM

## 2021-09-16 LAB
B-HCG SERPL-ACNC: <2 MIU/ML
BASOPHILS # BLD AUTO: 0.07 THOUSANDS/ΜL (ref 0–0.1)
BASOPHILS NFR BLD AUTO: 1 % (ref 0–1)
EOSINOPHIL # BLD AUTO: 0.03 THOUSAND/ΜL (ref 0–0.61)
EOSINOPHIL NFR BLD AUTO: 1 % (ref 0–6)
ERYTHROCYTE [DISTWIDTH] IN BLOOD BY AUTOMATED COUNT: 11.7 % (ref 11.6–15.1)
HCT VFR BLD AUTO: 41.6 % (ref 34.8–46.1)
HGB BLD-MCNC: 13.8 G/DL (ref 11.5–15.4)
IMM GRANULOCYTES # BLD AUTO: 0.02 THOUSAND/UL (ref 0–0.2)
IMM GRANULOCYTES NFR BLD AUTO: 0 % (ref 0–2)
LYMPHOCYTES # BLD AUTO: 1.38 THOUSANDS/ΜL (ref 0.6–4.47)
LYMPHOCYTES NFR BLD AUTO: 21 % (ref 14–44)
MCH RBC QN AUTO: 31.4 PG (ref 26.8–34.3)
MCHC RBC AUTO-ENTMCNC: 33.2 G/DL (ref 31.4–37.4)
MCV RBC AUTO: 95 FL (ref 82–98)
MONOCYTES # BLD AUTO: 0.26 THOUSAND/ΜL (ref 0.17–1.22)
MONOCYTES NFR BLD AUTO: 4 % (ref 4–12)
NEUTROPHILS # BLD AUTO: 4.9 THOUSANDS/ΜL (ref 1.85–7.62)
NEUTS SEG NFR BLD AUTO: 73 % (ref 43–75)
NRBC BLD AUTO-RTO: 0 /100 WBCS
PLATELET # BLD AUTO: 269 THOUSANDS/UL (ref 149–390)
PMV BLD AUTO: 9.7 FL (ref 8.9–12.7)
RBC # BLD AUTO: 4.39 MILLION/UL (ref 3.81–5.12)
WBC # BLD AUTO: 6.66 THOUSAND/UL (ref 4.31–10.16)

## 2021-09-16 PROCEDURE — 36415 COLL VENOUS BLD VENIPUNCTURE: CPT

## 2021-09-16 PROCEDURE — 85025 COMPLETE CBC W/AUTO DIFF WBC: CPT

## 2021-09-16 PROCEDURE — 84702 CHORIONIC GONADOTROPIN TEST: CPT

## 2021-09-17 ENCOUNTER — HOSPITAL ENCOUNTER (OUTPATIENT)
Facility: HOSPITAL | Age: 31
Setting detail: OUTPATIENT SURGERY
Discharge: HOME | End: 2021-09-17
Attending: OBSTETRICS & GYNECOLOGY | Admitting: OBSTETRICS & GYNECOLOGY
Payer: COMMERCIAL

## 2021-09-17 ENCOUNTER — ANESTHESIA EVENT (OUTPATIENT)
Dept: PERIOP | Facility: HOSPITAL | Age: 31
End: 2021-09-17
Payer: COMMERCIAL

## 2021-09-17 ENCOUNTER — ANESTHESIA (OUTPATIENT)
Dept: PERIOP | Facility: HOSPITAL | Age: 31
End: 2021-09-17
Payer: COMMERCIAL

## 2021-09-17 VITALS
RESPIRATION RATE: 18 BRPM | DIASTOLIC BLOOD PRESSURE: 60 MMHG | HEIGHT: 64 IN | TEMPERATURE: 98.2 F | SYSTOLIC BLOOD PRESSURE: 106 MMHG | HEART RATE: 69 BPM | BODY MASS INDEX: 21.68 KG/M2 | WEIGHT: 127 LBS | OXYGEN SATURATION: 99 %

## 2021-09-17 DIAGNOSIS — N83.202 UNSPECIFIED OVARIAN CYST, LEFT SIDE: ICD-10-CM

## 2021-09-17 DIAGNOSIS — Z87.42 S/P OVARIAN CYSTECTOMY: Primary | ICD-10-CM

## 2021-09-17 DIAGNOSIS — Z98.890 S/P OVARIAN CYSTECTOMY: Primary | ICD-10-CM

## 2021-09-17 LAB
EXT PREGNANCY TEST URINE: NEGATIVE
EXT. CONTROL: NORMAL

## 2021-09-17 PROCEDURE — 88305 TISSUE EXAM BY PATHOLOGIST: CPT | Performed by: PATHOLOGY

## 2021-09-17 PROCEDURE — 88112 CYTOPATH CELL ENHANCE TECH: CPT | Performed by: PATHOLOGY

## 2021-09-17 PROCEDURE — 81025 URINE PREGNANCY TEST: CPT | Performed by: OBSTETRICS & GYNECOLOGY

## 2021-09-17 RX ORDER — PROMETHAZINE HYDROCHLORIDE 25 MG/ML
12.5 INJECTION, SOLUTION INTRAMUSCULAR; INTRAVENOUS ONCE AS NEEDED
Status: DISCONTINUED | OUTPATIENT
Start: 2021-09-17 | End: 2021-09-17 | Stop reason: HOSPADM

## 2021-09-17 RX ORDER — ACETAMINOPHEN 325 MG/1
650 TABLET ORAL EVERY 6 HOURS PRN
Status: DISCONTINUED | OUTPATIENT
Start: 2021-09-17 | End: 2021-09-17 | Stop reason: HOSPADM

## 2021-09-17 RX ORDER — ROCURONIUM BROMIDE 10 MG/ML
INJECTION, SOLUTION INTRAVENOUS AS NEEDED
Status: DISCONTINUED | OUTPATIENT
Start: 2021-09-17 | End: 2021-09-17

## 2021-09-17 RX ORDER — ONDANSETRON 2 MG/ML
4 INJECTION INTRAMUSCULAR; INTRAVENOUS ONCE AS NEEDED
Status: DISCONTINUED | OUTPATIENT
Start: 2021-09-17 | End: 2021-09-17 | Stop reason: HOSPADM

## 2021-09-17 RX ORDER — IBUPROFEN 600 MG/1
600 TABLET ORAL EVERY 6 HOURS PRN
Qty: 30 TABLET | Refills: 0
Start: 2021-09-17 | End: 2022-08-01

## 2021-09-17 RX ORDER — PROPOFOL 10 MG/ML
INJECTION, EMULSION INTRAVENOUS AS NEEDED
Status: DISCONTINUED | OUTPATIENT
Start: 2021-09-17 | End: 2021-09-17

## 2021-09-17 RX ORDER — HYDROMORPHONE HCL IN WATER/PF 6 MG/30 ML
0.2 PATIENT CONTROLLED ANALGESIA SYRINGE INTRAVENOUS
Status: CANCELLED | OUTPATIENT
Start: 2021-09-17

## 2021-09-17 RX ORDER — ONDANSETRON 2 MG/ML
INJECTION INTRAMUSCULAR; INTRAVENOUS AS NEEDED
Status: DISCONTINUED | OUTPATIENT
Start: 2021-09-17 | End: 2021-09-17

## 2021-09-17 RX ORDER — KETOROLAC TROMETHAMINE 30 MG/ML
INJECTION, SOLUTION INTRAMUSCULAR; INTRAVENOUS AS NEEDED
Status: DISCONTINUED | OUTPATIENT
Start: 2021-09-17 | End: 2021-09-17

## 2021-09-17 RX ORDER — SODIUM CHLORIDE, SODIUM LACTATE, POTASSIUM CHLORIDE, CALCIUM CHLORIDE 600; 310; 30; 20 MG/100ML; MG/100ML; MG/100ML; MG/100ML
INJECTION, SOLUTION INTRAVENOUS CONTINUOUS PRN
Status: DISCONTINUED | OUTPATIENT
Start: 2021-09-17 | End: 2021-09-17

## 2021-09-17 RX ORDER — NEOSTIGMINE METHYLSULFATE 1 MG/ML
INJECTION INTRAVENOUS AS NEEDED
Status: DISCONTINUED | OUTPATIENT
Start: 2021-09-17 | End: 2021-09-17

## 2021-09-17 RX ORDER — IBUPROFEN 600 MG/1
600 TABLET ORAL EVERY 6 HOURS PRN
Status: DISCONTINUED | OUTPATIENT
Start: 2021-09-17 | End: 2021-09-17 | Stop reason: HOSPADM

## 2021-09-17 RX ORDER — ALBUTEROL SULFATE 2.5 MG/3ML
2.5 SOLUTION RESPIRATORY (INHALATION) ONCE AS NEEDED
Status: DISCONTINUED | OUTPATIENT
Start: 2021-09-17 | End: 2021-09-17 | Stop reason: HOSPADM

## 2021-09-17 RX ORDER — MEPERIDINE HYDROCHLORIDE 25 MG/ML
12.5 INJECTION INTRAMUSCULAR; INTRAVENOUS; SUBCUTANEOUS
Status: CANCELLED | OUTPATIENT
Start: 2021-09-17

## 2021-09-17 RX ORDER — MIDAZOLAM HYDROCHLORIDE 2 MG/2ML
INJECTION, SOLUTION INTRAMUSCULAR; INTRAVENOUS AS NEEDED
Status: DISCONTINUED | OUTPATIENT
Start: 2021-09-17 | End: 2021-09-17

## 2021-09-17 RX ORDER — DEXAMETHASONE SODIUM PHOSPHATE 10 MG/ML
INJECTION, SOLUTION INTRAMUSCULAR; INTRAVENOUS AS NEEDED
Status: DISCONTINUED | OUTPATIENT
Start: 2021-09-17 | End: 2021-09-17

## 2021-09-17 RX ORDER — ONDANSETRON 2 MG/ML
4 INJECTION INTRAMUSCULAR; INTRAVENOUS EVERY 6 HOURS PRN
Status: DISCONTINUED | OUTPATIENT
Start: 2021-09-17 | End: 2021-09-17 | Stop reason: HOSPADM

## 2021-09-17 RX ORDER — GLYCOPYRROLATE 0.2 MG/ML
INJECTION INTRAMUSCULAR; INTRAVENOUS AS NEEDED
Status: DISCONTINUED | OUTPATIENT
Start: 2021-09-17 | End: 2021-09-17

## 2021-09-17 RX ORDER — PROMETHAZINE HYDROCHLORIDE 25 MG/ML
12.5 INJECTION, SOLUTION INTRAMUSCULAR; INTRAVENOUS ONCE AS NEEDED
Status: CANCELLED | OUTPATIENT
Start: 2021-09-17

## 2021-09-17 RX ORDER — FENTANYL CITRATE/PF 50 MCG/ML
50 SYRINGE (ML) INJECTION
Status: DISCONTINUED | OUTPATIENT
Start: 2021-09-17 | End: 2021-09-17 | Stop reason: HOSPADM

## 2021-09-17 RX ORDER — BUPIVACAINE HYDROCHLORIDE 2.5 MG/ML
INJECTION, SOLUTION EPIDURAL; INFILTRATION; INTRACAUDAL AS NEEDED
Status: DISCONTINUED | OUTPATIENT
Start: 2021-09-17 | End: 2021-09-17 | Stop reason: HOSPADM

## 2021-09-17 RX ORDER — EPHEDRINE SULFATE 50 MG/ML
INJECTION INTRAVENOUS AS NEEDED
Status: DISCONTINUED | OUTPATIENT
Start: 2021-09-17 | End: 2021-09-17

## 2021-09-17 RX ORDER — OXYCODONE HYDROCHLORIDE 5 MG/1
5 TABLET ORAL EVERY 4 HOURS PRN
Status: DISCONTINUED | OUTPATIENT
Start: 2021-09-17 | End: 2021-09-17 | Stop reason: HOSPADM

## 2021-09-17 RX ORDER — SENNOSIDES 8.6 MG
650 CAPSULE ORAL EVERY 8 HOURS PRN
Qty: 30 TABLET | Refills: 0
Start: 2021-09-17 | End: 2022-08-01

## 2021-09-17 RX ORDER — FENTANYL CITRATE/PF 50 MCG/ML
50 SYRINGE (ML) INJECTION
Status: CANCELLED | OUTPATIENT
Start: 2021-09-17

## 2021-09-17 RX ORDER — FENTANYL CITRATE 50 UG/ML
INJECTION, SOLUTION INTRAMUSCULAR; INTRAVENOUS AS NEEDED
Status: DISCONTINUED | OUTPATIENT
Start: 2021-09-17 | End: 2021-09-17

## 2021-09-17 RX ORDER — LIDOCAINE HYDROCHLORIDE 10 MG/ML
INJECTION, SOLUTION EPIDURAL; INFILTRATION; INTRACAUDAL; PERINEURAL AS NEEDED
Status: DISCONTINUED | OUTPATIENT
Start: 2021-09-17 | End: 2021-09-17

## 2021-09-17 RX ORDER — CEFAZOLIN SODIUM 1 G/50ML
1000 SOLUTION INTRAVENOUS ONCE
Status: COMPLETED | OUTPATIENT
Start: 2021-09-17 | End: 2021-09-17

## 2021-09-17 RX ORDER — HYDROMORPHONE HCL/PF 1 MG/ML
0.5 SYRINGE (ML) INJECTION
Status: DISCONTINUED | OUTPATIENT
Start: 2021-09-17 | End: 2021-09-17 | Stop reason: HOSPADM

## 2021-09-17 RX ORDER — KETAMINE HCL IN NACL, ISO-OSM 100MG/10ML
SYRINGE (ML) INJECTION AS NEEDED
Status: DISCONTINUED | OUTPATIENT
Start: 2021-09-17 | End: 2021-09-17

## 2021-09-17 RX ADMIN — Medication 20 MG: at 07:39

## 2021-09-17 RX ADMIN — KETOROLAC TROMETHAMINE 30 MG: 30 INJECTION, SOLUTION INTRAMUSCULAR; INTRAVENOUS at 08:11

## 2021-09-17 RX ADMIN — FENTANYL CITRATE 50 MCG: 50 INJECTION INTRAMUSCULAR; INTRAVENOUS at 09:29

## 2021-09-17 RX ADMIN — MIDAZOLAM 2 MG: 1 INJECTION INTRAMUSCULAR; INTRAVENOUS at 07:33

## 2021-09-17 RX ADMIN — Medication 50 MCG: at 10:03

## 2021-09-17 RX ADMIN — PROPOFOL 160 MG: 10 INJECTION, EMULSION INTRAVENOUS at 07:39

## 2021-09-17 RX ADMIN — Medication 50 MCG: at 09:49

## 2021-09-17 RX ADMIN — EPHEDRINE SULFATE 5 MG: 50 INJECTION, SOLUTION INTRAVENOUS at 07:53

## 2021-09-17 RX ADMIN — DEXAMETHASONE SODIUM PHOSPHATE 10 MG: 10 INJECTION, SOLUTION INTRAMUSCULAR; INTRAVENOUS at 07:56

## 2021-09-17 RX ADMIN — FENTANYL CITRATE 50 MCG: 50 INJECTION INTRAMUSCULAR; INTRAVENOUS at 07:39

## 2021-09-17 RX ADMIN — FENTANYL CITRATE 50 MCG: 50 INJECTION INTRAMUSCULAR; INTRAVENOUS at 08:04

## 2021-09-17 RX ADMIN — LIDOCAINE HYDROCHLORIDE 50 MG: 10 INJECTION, SOLUTION EPIDURAL; INFILTRATION; INTRACAUDAL; PERINEURAL at 07:39

## 2021-09-17 RX ADMIN — FENTANYL CITRATE 50 MCG: 50 INJECTION INTRAMUSCULAR; INTRAVENOUS at 09:26

## 2021-09-17 RX ADMIN — CEFAZOLIN SODIUM 1000 MG: 1 SOLUTION INTRAVENOUS at 07:29

## 2021-09-17 RX ADMIN — Medication 10 MG: at 08:04

## 2021-09-17 RX ADMIN — NEOSTIGMINE METHYLSULFATE 3 MG: 1 INJECTION INTRAVENOUS at 09:03

## 2021-09-17 RX ADMIN — SODIUM CHLORIDE, SODIUM LACTATE, POTASSIUM CHLORIDE, AND CALCIUM CHLORIDE: .6; .31; .03; .02 INJECTION, SOLUTION INTRAVENOUS at 07:23

## 2021-09-17 RX ADMIN — ROCURONIUM BROMIDE 40 MG: 50 INJECTION, SOLUTION INTRAVENOUS at 07:39

## 2021-09-17 RX ADMIN — ONDANSETRON 4 MG: 2 INJECTION INTRAMUSCULAR; INTRAVENOUS at 07:33

## 2021-09-17 RX ADMIN — GLYCOPYRROLATE 0.4 MG: 0.2 INJECTION, SOLUTION INTRAMUSCULAR; INTRAVENOUS at 09:03

## 2021-09-17 NOTE — INTERVAL H&P NOTE
H&P reviewed  After examining the patient I find no changes in the patients condition since the H&P had been written      Vitals:    09/17/21 0603   BP: 116/61   Pulse: 62   Resp: 16   Temp: (!) 97 1 °F (36 2 °C)   SpO2: 100%

## 2021-09-17 NOTE — DISCHARGE INSTRUCTIONS
Laparoscopic Excision of Ovarian Cysts   WHAT YOU NEED TO KNOW:   Laparoscopic excision is surgery to remove a cyst on your ovary  DISCHARGE INSTRUCTIONS:   Medicines:   · Antibiotics:  Always take any antibiotics exactly as ordered by your healthcare provider  Do not stop taking your medicine unless directed by your healthcare provider  Never save antibiotics or take leftover antibiotics that were given to you for another illness  · Pain medicine: You may be given a prescription medicine to decrease pain  Do not wait until the pain is severe before you take this medicine  · Take your medicine as directed  Contact your healthcare provider if you think your medicine is not helping or if you have side effects  Tell him or her if you are allergic to any medicine  Keep a list of the medicines, vitamins, and herbs you take  Include the amounts, and when and why you take them  Bring the list or the pill bottles to follow-up visits  Carry your medicine list with you in case of an emergency  Follow up with your healthcare provider as directed:  Write down your questions so you remember to ask them during your visits  Contact your healthcare provider if:   · You have stomach cramps  · You have a fever  · You have questions or concerns about your condition or care  Seek care immediately or call 911 if:   · You have bleeding that does not stop  · You have severe abdominal pain that does not go away, even with medicine  · You feel lightheaded and short of breath  · You have chest pain when you take a deep breath or cough, or you cough up blood  · Your arm or leg feels warm, tender, and painful  It may look swollen and red  © 2017 [employer]  Information is for End User's use only and may not be sold, redistributed or otherwise used for commercial purposes   All illustrations and images included in CareNotes® are the copyrighted property of [employer]  or [employer]  The above information is an  only  It is not intended as medical advice for individual conditions or treatments  Talk to your doctor, nurse or pharmacist before following any medical regimen to see if it is safe and effective for you

## 2021-09-17 NOTE — ANESTHESIA POSTPROCEDURE EVALUATION
Post-Op Assessment Note    CV Status:  Stable  Pain Score: 0    Pain management: adequate     Mental Status:  Alert and awake   Hydration Status:  Euvolemic   PONV Controlled:  Controlled   Airway Patency:  Patent      Post Op Vitals Reviewed: Yes      Staff: CRNA         No complications documented      BP   119/76   Temp  98 8   Pulse  72   Resp   20   SpO2   100

## 2021-09-17 NOTE — ANESTHESIA POSTPROCEDURE EVALUATION
Post-Op Assessment Note    CV Status:  Stable  Pain Score: 0    Pain management: adequate     Mental Status:  Alert   Hydration Status:  Stable   PONV Controlled:  None   Airway Patency:  Patent      Post Op Vitals Reviewed: Yes      Staff: CRNA, Anesthesiologist         No complications documented      BP  119/76   Temp  98   Pulse  73   Resp   18   SpO2   100

## 2021-09-17 NOTE — OP NOTE
OPERATIVE REPORT  PATIENT NAME: Dina Mireles    :  1990  MRN: [id number]  Pt Location:  OR ROOM 03    SURGERY DATE: 2021    Surgeon(s) and Role:     * Kaitlin Geller MD - Primary    Preop Diagnosis:  Unspecified ovarian cyst, left side [N83 202]    Post-Op Diagnosis Codes:     * Unspecified ovarian cyst, left side [N83 202]    Procedure(s) (LRB):  CYSTECTOMY OVARIAN, LAPAROSCOPIC (Left)    Specimen(s):  Left ovarian cyst    Estimated Blood Loss:   Minimal    Drains:  None    Anesthesia Type:   General    Operative Indications:  10x7cm ovarian cyst filled with clear fluid, left side [N83 202]      Operative Findings:  Uterus was sounded to 8cm, anteverted and anteflexed  Upon laparoscopy, uterus was normal in size and shape  Both tubes were surgically absent  Right ovary appeared normal in size and shape with filmy anabella-ovarian adhesion but freely mobile  Left ovary had a large clear fluid filled cyst, about 10x7 cm in size  Normal ovarian tissue was noted compressed laterally by the cyst  [place] was normal with no mass or adhesion  No abdominal or pelvic implant or excresence was noted  Liver and gall bladder were within normal limits  Ascending colon was adherent to right lower quadrant with filmy adhesion  Complications:   None    Procedure and Technique:  Patient was identified by myself as Dina Mireles, face to face, in the holding area, croft #1  She was taken to OR#3 and placed supine on the operating table  General anesthesia was induced  Patient was intubated  Lower extremities were wrapped with intermittent compression boots and placed in [place] stirrups  Abdomen, perineum, and vagina were prepped and draped  Roman catheter was inserted into bladder and drained by gravity  Time out was taken  1 gm Ancef was given IV  Procedure started in the perineum  Single valve speculum was placed vaginally   A Humi uterine manipulator was placed into the uterus at 7cm with no difficulty  Procedure then moved to the abdominal area  A 1cm umbilical incision was made  A 5mm bladeless trocar was placed into the umbilical incision under direct visualization  Entry into the abdomen was accomplished with no difficulty  Bowel contents were inspected  No bleeding or injury was noted  Inspection of the pelvis was carried out  Findings as noted above were seen  Two additional 5mm ports were placed in the lower abdomen, one in the left lower quadrant and one in the right lower quadrant  A Nezhart irrigation suction instrument was placed in one of the ports  Peritoneal washing in the culdesac was taken  An incision was made at the anterior pole of the left cyst  Serous fluid from the cyst was completely suctioned  An attempt was made to dissect between the ovarian capsule and the cyst wall  A clear plane of dissection could not be clearly identified  So the normal left ovarian tissue was identified and the cyst was transected at about 2-3 cm medial from the normal ovarian tissue  After the bulk of the cyst was transected, a clear plane of dissection between the cyst wall and the ovarian tissue was found  Traction was applied on the cyst wall and counter traction was applied on the ovarian capsule,  the two in the process  The remaining cyst wall was stripped from the normal ovarian tissue using serial steps of traction and counter traction  Hemostasis was observed  The ovarian cyst specimen was removed from the right lower quadrant port with no complication  Pelvis was irrigated with saline until the return was clear  Procedure was deemed complete  All instruments were removed abdominally  The three abdominal incisions were closed with surgical adhesive  0 25% Bupivacaine was injected into all three incisions  Radio frequency detection loop was applied over the abdomen and no sponge was detected  All instruments were removed vaginally   Hemostasis on the anterior lip of the cervix was observed  Roman catheter was removed  Patient was awakened from general anesthesia  She was extubated in the operating room and taken to recovery room in stable conditions       I was present for the entire procedure    Patient Disposition:  PACU     SIGNATURE: Jody Bernal MD  DATE: September 17, 2021  TIME: 7:26 AM

## 2021-09-17 NOTE — ANESTHESIA PREPROCEDURE EVALUATION
Procedure:  CYSTECTOMY OVARIAN, LAPAROSCOPIC (Left Abdomen)    Relevant Problems   No relevant active problems        Physical Exam    Airway    Mallampati score: II  TM Distance: >3 FB  Neck ROM: full     Dental       Cardiovascular  Rhythm: regular, Rate: normal,     Pulmonary  Breath sounds clear to auscultation,     Other Findings        Anesthesia Plan  ASA Score- 1     Anesthesia Type- general with ASA Monitors  Additional Monitors:   Airway Plan: ETT  Plan Factors-Exercise tolerance (METS): >4 METS  Chart reviewed  Existing labs reviewed  Patient summary reviewed  Induction- intravenous  Postoperative Plan-   Planned trial extubation    Informed Consent- Anesthetic plan and risks discussed with patient  I personally reviewed this patient with the CRNA  Discussed and agreed on the Anesthesia Plan with the CRNA  Dada Godfrey

## 2022-08-01 ENCOUNTER — ANNUAL EXAM (OUTPATIENT)
Dept: OBGYN CLINIC | Facility: CLINIC | Age: 32
End: 2022-08-01
Payer: COMMERCIAL

## 2022-08-01 VITALS
SYSTOLIC BLOOD PRESSURE: 108 MMHG | DIASTOLIC BLOOD PRESSURE: 80 MMHG | WEIGHT: 118.2 LBS | HEIGHT: 64 IN | BODY MASS INDEX: 20.18 KG/M2

## 2022-08-01 DIAGNOSIS — Z01.419 WOMEN'S ANNUAL ROUTINE GYNECOLOGICAL EXAMINATION: Primary | ICD-10-CM

## 2022-08-01 PROCEDURE — S0612 ANNUAL GYNECOLOGICAL EXAMINA: HCPCS | Performed by: NURSE PRACTITIONER

## 2022-08-01 RX ORDER — BUPROPION HYDROCHLORIDE 150 MG/1
150 TABLET ORAL EVERY MORNING
COMMUNITY
Start: 2022-07-28

## 2022-08-01 NOTE — PROGRESS NOTES
Subjective    HPI:     Vinod Crocker is a 33 y o  female  She is a  1 Para 1, with an   Her menstrual cycles are regular and predictable  Her current method of contraception includes tubal ligation  She denies issues with intimacy  She denies /GI and Gyn complaints  She feels safe at home  She denies depression/anxiety  Stable on Wellbutrin  Medical, surgical and family history reviewed  She had a left cystectomy in 2021 with Dr Helen Dennis  Her dental care is up-to-date  She eats a healthy diet  She is happy with her weight  Gynecologic History    Patient's last menstrual period was 2022 (exact date)  Last Pap: 2020  Results were: normal    Obstetric History    OB History    Para Term  AB Living   1 1 1     1   SAB IAB Ectopic Multiple Live Births           1      # Outcome Date GA Lbr Kirit/2nd Weight Sex Delivery Anes PTL Lv   1 Term 19   2807 g (6 lb 3 oz) F Vag-Spont EPI  HALI      Birth Comments: induced        The following portions of the patient's history were reviewed and updated as appropriate: allergies, current medications, past family history, past medical history, past social history, past surgical history and problem list     Review of Systems    Pertinent items are noted in HPI  Objective    Physical Exam  Constitutional:       Appearance: Normal appearance  She is well-developed  Genitourinary:      Vulva, bladder and urethral meatus normal       No lesions in the vagina  Right Labia: No rash, tenderness, lesions, skin changes or Bartholin's cyst      Left Labia: No tenderness, lesions, skin changes, Bartholin's cyst or rash  No labial fusion noted  No inguinal adenopathy present in the right or left side  No vaginal discharge, erythema, tenderness, bleeding or granulation tissue  No vaginal prolapse present  No vaginal atrophy present  Right Adnexa: not tender, not full and no mass present       Left Adnexa: not tender, not full and no mass present  Cervix is parous  No cervical motion tenderness, discharge, friability, lesion, polyp or nabothian cyst       Uterus is not enlarged, tender, irregular or prolapsed  No uterine mass detected  Uterus is anteverted  Pelvic exam was performed with patient in the lithotomy position  Breasts: Breasts are symmetrical       Right: No inverted nipple, mass, nipple discharge, skin change, tenderness, axillary adenopathy or supraclavicular adenopathy  Left: No inverted nipple, mass, nipple discharge, skin change, tenderness, axillary adenopathy or supraclavicular adenopathy  HENT:      Head: Normocephalic and atraumatic  Neck:      Thyroid: No thyromegaly  Cardiovascular:      Rate and Rhythm: Normal rate and regular rhythm  Heart sounds: Normal heart sounds, S1 normal and S2 normal    Pulmonary:      Effort: Pulmonary effort is normal       Breath sounds: Normal breath sounds  Abdominal:      General: Bowel sounds are normal  There is no distension  Palpations: Abdomen is soft  There is no mass  Tenderness: There is no abdominal tenderness  There is no guarding  Hernia: There is no hernia in the left inguinal area or right inguinal area  Musculoskeletal:      Cervical back: Neck supple  Lymphadenopathy:      Cervical: No cervical adenopathy  Upper Body:      Right upper body: No supraclavicular or axillary adenopathy  Left upper body: No supraclavicular or axillary adenopathy  Lower Body: No right inguinal adenopathy  No left inguinal adenopathy  Neurological:      Mental Status: She is alert  Skin:     General: Skin is warm and dry  Findings: No rash  Psychiatric:         Attention and Perception: Attention and perception normal          Mood and Affect: Mood and affect normal          Speech: Speech normal          Behavior: Behavior is cooperative  Thought Content:  Thought content normal          Cognition and Memory: Cognition and memory normal          Judgment: Judgment normal    Vitals and nursing note reviewed  Assessment and Plan    Latrice Wong was seen today for gynecologic exam     Diagnoses and all orders for this visit:    Women's annual routine gynecological examination      Patient informed of a Stable GYN exam  A pap smear was not performed due to a normal pap in 2020  I have discussed the importance of exercise and healthy diet as well as adequate intake of calcium and vitamin D  The current ASCCP guidelines were reviewed  The low risk patient will receive pap smear screening every 3 years until the age of 34 and then every 3 to 5 years with HPV co-testing from the ages of 33-67  I emphasized the importance of an annual pelvic and breast exam  A yearly mammogram is recommended for breast cancer screening starting at age 36  Results will be released to [facility], if abnormal will call to review and discuss treatment plan  All questions have been answered to her satisfaction  Contraception: tubal ligation  Follow up in: 1 year or sooner

## 2023-08-21 ENCOUNTER — ANNUAL EXAM (OUTPATIENT)
Dept: OBGYN CLINIC | Facility: CLINIC | Age: 33
End: 2023-08-21
Payer: COMMERCIAL

## 2023-08-21 VITALS
HEIGHT: 63 IN | DIASTOLIC BLOOD PRESSURE: 70 MMHG | WEIGHT: 111 LBS | BODY MASS INDEX: 19.67 KG/M2 | SYSTOLIC BLOOD PRESSURE: 118 MMHG

## 2023-08-21 DIAGNOSIS — Z01.419 WOMEN'S ANNUAL ROUTINE GYNECOLOGICAL EXAMINATION: Primary | ICD-10-CM

## 2023-08-21 PROCEDURE — G0476 HPV COMBO ASSAY CA SCREEN: HCPCS | Performed by: OBSTETRICS & GYNECOLOGY

## 2023-08-21 PROCEDURE — G0145 SCR C/V CYTO,THINLAYER,RESCR: HCPCS | Performed by: OBSTETRICS & GYNECOLOGY

## 2023-08-21 PROCEDURE — S0612 ANNUAL GYNECOLOGICAL EXAMINA: HCPCS | Performed by: OBSTETRICS & GYNECOLOGY

## 2023-08-21 RX ORDER — DULOXETIN HYDROCHLORIDE 30 MG/1
30 CAPSULE, DELAYED RELEASE ORAL DAILY
COMMUNITY
Start: 2023-07-31

## 2023-08-21 NOTE — PATIENT INSTRUCTIONS
The patient was informed of a stable GYN examination. A Pap smear was performed. We had a discussion about her weight. She was concerned that she may be too thin. She will monitor this. She gets 105 pounds she will contact me. She does have a history of IVF pregnancy in the past.  She is not sure what future plans. She will return to my office in 1 year unless new issues occur.

## 2023-08-21 NOTE — PROGRESS NOTES
Assessment/Plan:    The patient was informed of a stable GYN examination. We will continue to monitor her weight but she should not get too thin. She will continue her medication for anxiety and depression and this is working well. At the present time this is not interfering with intimacy. She feels safe at home. She sees a dentist on a regular basis. There are no new major family illnesses to report. She should return to my office in 1 year unless new issues occur. She does have a history of ovarian cyst in the past.         Subjective:      Patient ID: Liborio Pierre is a 29 y.o. female. HPI    This is a 41-year-old white female. She is a  1 para 1 with 1 vaginal delivery almost 4 years ago. That was an IVF pregnancy. The patient has a history of bilateral salpingectomy prior to pregnancy. She did have a vaginal delivery. She denies any major  or GI complaint. She is content with her weight. She feels safe at home. She is self-employed with her . She is content with her weight although she think she could weigh probably 4 pounds more. She will need a Pap smear today. Denies any major  or GI complaint. Denies any problem with intimacy. She does have a history of depression and anxiety currently she is on Wellbutrin and Cymbalta which is working well. She feels safe at home. She sees a dentist on a regular basis. Family history significant for heart attack and diabetes. She is not a smoker. She has a history of a left ovarian cystectomy approximately 2 years ago. For benign disease.         The following portions of the patient's history were reviewed and updated as appropriate: allergies, current medications, past family history, past medical history, past social history, past surgical history and problem list.    Review of Systems      Objective:      /70   Ht 5' 3" (1.6 m)   Wt 50.3 kg (111 lb)   LMP 2023 (Approximate)   BMI 19.66 kg/m² Physical Exam  Vitals reviewed. Exam conducted with a chaperone present. Constitutional:       Appearance: Normal appearance. She is normal weight. HENT:      Head: Normocephalic and atraumatic. Nose: Nose normal.      Mouth/Throat:      Mouth: Mucous membranes are moist.      Pharynx: Oropharynx is clear. Eyes:      Extraocular Movements: Extraocular movements intact. Pupils: Pupils are equal, round, and reactive to light. Cardiovascular:      Rate and Rhythm: Normal rate and regular rhythm. Pulses: Normal pulses. Heart sounds: Normal heart sounds. Pulmonary:      Effort: Pulmonary effort is normal.      Breath sounds: Normal breath sounds. Chest:   Breasts:     Right: Normal. No swelling, bleeding, inverted nipple, mass or nipple discharge. Left: Normal. No swelling, bleeding, inverted nipple, mass, nipple discharge or skin change. Comments: Evidence of bilateral breast augmentation surgery. She is happy with results this is less than 2 years ago. Abdominal:      General: Abdomen is flat. Bowel sounds are normal. There is no distension. Palpations: Abdomen is soft. There is no hepatomegaly, splenomegaly or mass. Tenderness: There is no rebound. Hernia: No hernia is present. There is no hernia in the umbilical area, ventral area, left inguinal area or right inguinal area. Genitourinary:     General: Normal vulva. Pubic Area: No rash or pubic lice. Labia:         Right: No rash, tenderness, lesion or injury. Left: No rash, tenderness, lesion or injury. Urethra: No prolapse, urethral pain, urethral swelling or urethral lesion. Vagina: Normal. No signs of injury and foreign body. No vaginal discharge or erythema. Cervix: Normal.      Uterus: Normal. Not deviated, not enlarged, not fixed, not tender and no uterine prolapse.        Adnexa: Right adnexa normal and left adnexa normal.        Right: No mass, tenderness or fullness. Left: No mass, tenderness or fullness. Rectum: Normal. Guaiac result negative. No mass, tenderness or anal fissure. Comments: The external genitalia normal limits the vagina is clean the adnexa clear bilaterally. There is evidence of recent menses. A Pap smear was performed. There is no evidence of prolapse. Urethra is normal working relationship with the bladder. Musculoskeletal:         General: Normal range of motion. Cervical back: Normal range of motion and neck supple. Lymphadenopathy:      Lower Body: No right inguinal adenopathy. No left inguinal adenopathy. Skin:     General: Skin is warm and dry. Neurological:      General: No focal deficit present. Mental Status: She is alert and oriented to person, place, and time.    Psychiatric:         Mood and Affect: Mood normal.         Behavior: Behavior normal.

## 2023-08-22 LAB
HPV HR 12 DNA CVX QL NAA+PROBE: NEGATIVE
HPV16 DNA CVX QL NAA+PROBE: NEGATIVE
HPV18 DNA CVX QL NAA+PROBE: NEGATIVE

## 2023-08-25 LAB
LAB AP GYN PRIMARY INTERPRETATION: NORMAL
Lab: NORMAL

## 2024-10-09 ENCOUNTER — ANNUAL EXAM (OUTPATIENT)
Dept: OBGYN CLINIC | Facility: CLINIC | Age: 34
End: 2024-10-09
Payer: COMMERCIAL

## 2024-10-09 VITALS — SYSTOLIC BLOOD PRESSURE: 104 MMHG | BODY MASS INDEX: 19.7 KG/M2 | WEIGHT: 111.2 LBS | DIASTOLIC BLOOD PRESSURE: 78 MMHG

## 2024-10-09 DIAGNOSIS — Z01.419 WOMEN'S ANNUAL ROUTINE GYNECOLOGICAL EXAMINATION: Primary | ICD-10-CM

## 2024-10-09 PROCEDURE — S0612 ANNUAL GYNECOLOGICAL EXAMINA: HCPCS | Performed by: OBSTETRICS & GYNECOLOGY

## 2024-10-09 NOTE — PROGRESS NOTES
Ambulatory Visit  Name: Gabi Davey      : 1990      MRN: [id number]  Encounter Provider: Syd Long MD  Encounter Date: 10/9/2024   Encounter department: OB GYN [facility]    Assessment & Plan  Women's annual routine gynecological examination  The patient was informed of a stable GYN examination.  A Pap smear was not performed.  She is content with her weight.  She feels safe at home.  There is no problem with intimacy from what contraception is a tubal ligation.  History of IVF in the past.  History of depression well-controlled with Wellbutrin and Cymbalta.  She sees a psychiatrist on a regular basis.  There are no new major family illnesses to report.  The patient denies any problem with  or GI complaints.  She is to return to our office in 1 year unless new issues occur.         History of Present Illness     Gabi Davey is a 34 y.o. female who presents for annual GYN examination.  She is a  1 para 1 with a vaginal delivery approximately 5 years ago.  She had to use the assistance of IVF team.  Prior to pregnant she had a history of a bilateral partial salpingectomy for adhesions.  Her menstrual cycles are regular and predictable.  She feels safe at home.  She is content with her weight.  She sees a dentist on a regular basis.  There is no problem with bladder control there is no problem with intimacy.  She does have a history of depression and anxiety this is controlled with Wellbutrin and Cymbalta.  She sees a psychiatrist on a regular basis.  She feels this is helping her.  The medication regimen is not interfering with her libido.  There are no new major family illnesses to report.  She has a history of a recent breast surgery approximately 2 years ago with a breast reduction and breast lift.  She is happy with the results.  She does not need a Pap smear today.  Her PHQ-9 score today is 6 , she checked off 1 score for 1 through 4 and 1 score 4 #5 of 2 for total of  6.      Review of Systems   All other systems reviewed and are negative.          Objective     /78   Wt 50.4 kg (111 lb 3.2 oz)   LMP 09/24/2024 (Exact Date)   BMI 19.70 kg/m²     Physical Exam  Vitals reviewed. Exam conducted with a chaperone present.   Constitutional:       Appearance: Normal appearance. She is normal weight.   HENT:      Head: Normocephalic and atraumatic.      Nose: Nose normal.   Eyes:      Extraocular Movements: Extraocular movements intact.      Pupils: Pupils are equal, round, and reactive to light.   Cardiovascular:      Rate and Rhythm: Normal rate and regular rhythm.      Heart sounds: Normal heart sounds.   Pulmonary:      Effort: Pulmonary effort is normal.      Breath sounds: Normal breath sounds.   Chest:   Breasts:     Breasts are symmetrical.      Comments: Recent scars for breast reduction and lift are well-healed bilaterally.  Abdominal:      General: Abdomen is flat. Bowel sounds are normal.      Palpations: Abdomen is soft. There is no hepatomegaly or splenomegaly.      Tenderness: There is no abdominal tenderness.      Hernia: No hernia is present. There is no hernia in the left inguinal area or right inguinal area.   Genitourinary:     General: Normal vulva.      Pubic Area: No rash or pubic lice.       Labia:         Right: No rash, tenderness, lesion or injury.         Left: No rash, tenderness, lesion or injury.       Urethra: No prolapse, urethral pain, urethral swelling or urethral lesion.      Vagina: Normal. No signs of injury and foreign body. No vaginal discharge, erythema, tenderness, bleeding, lesions or prolapsed vaginal walls.      Cervix: Normal.      Uterus: Normal. Not deviated, not enlarged, not fixed, not tender and no uterine prolapse.       Adnexa: Right adnexa normal and left adnexa normal.      Rectum: Normal.      Comments: The external genitalia are within the normal limits, the vagina is clean the cervix is closed the uterus is midposition  normal size.  Pap smear was not performed.  There is no evidence of prolapse.  Urethra and bladder normal working relationship.  There is no cervical motion tenderness.  Musculoskeletal:         General: Normal range of motion.      Cervical back: Normal range of motion and neck supple.   Lymphadenopathy:      Upper Body:      Right upper body: No supraclavicular adenopathy.      Left upper body: No supraclavicular adenopathy.      Lower Body: No right inguinal adenopathy. No left inguinal adenopathy.   Skin:     General: Skin is warm and dry.   Neurological:      General: No focal deficit present.      Mental Status: She is alert and oriented to person, place, and time.   Psychiatric:         Mood and Affect: Mood normal.         Behavior: Behavior normal.

## (undated) DEVICE — ENDOPATH 5MM CURVED SCISSORS WITH MONOPOLAR CAUTERY: Brand: ENDOPATH

## (undated) DEVICE — DRAPE SURGIKIT SADDLE BAG

## (undated) DEVICE — SYRINGE 50ML LL

## (undated) DEVICE — INSUFFLATION TUBING PRIMFLO

## (undated) DEVICE — IRRIG ENDO FLO TUBING

## (undated) DEVICE — GLOVE PI ULTRA TOUCH SZ.7.0

## (undated) DEVICE — TRAY FOLEY 16FR URIMETER SILICONE SURESTEP

## (undated) DEVICE — ENDOPATH XCEL BLADELESS TROCARS WITH STABILITY SLEEVES: Brand: ENDOPATH XCEL

## (undated) DEVICE — INTENDED FOR TISSUE SEPARATION, AND OTHER PROCEDURES THAT REQUIRE A SHARP SURGICAL BLADE TO PUNCTURE OR CUT.: Brand: BARD-PARKER SAFETY BLADES SIZE 11, STERILE

## (undated) DEVICE — ENDOPATH XCEL UNIVERSAL TROCAR STABLILITY SLEEVES: Brand: ENDOPATH XCEL

## (undated) DEVICE — CHLORAPREP HI-LITE 26ML ORANGE

## (undated) DEVICE — 3000CC GUARDIAN II: Brand: GUARDIAN

## (undated) DEVICE — STERILE MINOR LAPAROSCOPY PACK: Brand: CARDINAL HEALTH

## (undated) DEVICE — ADHESIVE SKIN HIGH VISCOSITY EXOFIN 1ML

## (undated) DEVICE — GLOVE INDICATOR PI UNDERGLOVE SZ 7 BLUE

## (undated) DEVICE — SPONGE STICK WITH PVP-I: Brand: KENDALL

## (undated) DEVICE — MAYO STAND COVER: Brand: CONVERTORS

## (undated) DEVICE — PLASTIC ADHESIVE BANDAGE: Brand: CURITY

## (undated) DEVICE — TUBING SMOKE EVAC W/FILTRATION DEVICE PLUMEPORT ACTIV

## (undated) DEVICE — Device

## (undated) DEVICE — TROCAR: Brand: KII FIOS FIRST ENTRY

## (undated) DEVICE — ENSEAL LAPAROSCOPIC TISSUE SEALER G2 STRAIGHT JAW FOR USE WITH G2 GENERATOR 5MM DIAMETER 35CM SHAFT LENGTH: Brand: ENSEAL

## (undated) DEVICE — ELECTRODE LAP SPATULA CRV E-Z CLEAN 33CM -0018C

## (undated) DEVICE — ADHESIVE SKN CLSR HISTOACRYL FLEX 0.5ML LF

## (undated) DEVICE — PACK PBDS MINOR GYN LAP RF

## (undated) DEVICE — TROCAR: Brand: KII® SLEEVE